# Patient Record
Sex: FEMALE | Race: WHITE | NOT HISPANIC OR LATINO | URBAN - METROPOLITAN AREA
[De-identification: names, ages, dates, MRNs, and addresses within clinical notes are randomized per-mention and may not be internally consistent; named-entity substitution may affect disease eponyms.]

---

## 2021-09-16 ENCOUNTER — OFFICE VISIT (OUTPATIENT)
Dept: URGENT CARE | Facility: CLINIC | Age: 21
End: 2021-09-16
Payer: COMMERCIAL

## 2021-09-16 VITALS
BODY MASS INDEX: 31.22 KG/M2 | OXYGEN SATURATION: 100 % | RESPIRATION RATE: 18 BRPM | SYSTOLIC BLOOD PRESSURE: 121 MMHG | WEIGHT: 159 LBS | DIASTOLIC BLOOD PRESSURE: 78 MMHG | HEIGHT: 60 IN | HEART RATE: 93 BPM | TEMPERATURE: 97.9 F

## 2021-09-16 DIAGNOSIS — Z20.822 EXPOSURE TO COVID-19 VIRUS: ICD-10-CM

## 2021-09-16 DIAGNOSIS — R11.0 NAUSEA: ICD-10-CM

## 2021-09-16 DIAGNOSIS — J02.9 SORE THROAT: Primary | ICD-10-CM

## 2021-09-16 PROCEDURE — 99203 OFFICE O/P NEW LOW 30 MIN: CPT | Performed by: PHYSICIAN ASSISTANT

## 2021-09-16 PROCEDURE — U0005 INFEC AGEN DETEC AMPLI PROBE: HCPCS | Performed by: PHYSICIAN ASSISTANT

## 2021-09-16 PROCEDURE — U0003 INFECTIOUS AGENT DETECTION BY NUCLEIC ACID (DNA OR RNA); SEVERE ACUTE RESPIRATORY SYNDROME CORONAVIRUS 2 (SARS-COV-2) (CORONAVIRUS DISEASE [COVID-19]), AMPLIFIED PROBE TECHNIQUE, MAKING USE OF HIGH THROUGHPUT TECHNOLOGIES AS DESCRIBED BY CMS-2020-01-R: HCPCS | Performed by: PHYSICIAN ASSISTANT

## 2021-09-16 RX ORDER — ONDANSETRON 4 MG/1
4 TABLET, ORALLY DISINTEGRATING ORAL EVERY 6 HOURS PRN
Qty: 28 TABLET | Refills: 0 | Status: SHIPPED | OUTPATIENT
Start: 2021-09-16 | End: 2021-09-16

## 2021-09-16 NOTE — PROGRESS NOTES
[address] [employer] [facility]        NAME: Patrick Myrick is a 22 y o  female  : 2000    MRN: [id number]  DATE: 2021  TIME: 11:53 AM    Assessment and Plan   Sore throat [J02 9]  1  Sore throat  Novel Coronavirus (Covid-19),PCR [facility] [place] - Office Collection   2  Nausea  ondansetron (ZOFRAN-ODT) 4 mg disintegrating tablet   3  Exposure to COVID-19 virus  Novel Coronavirus (Covid-19),PCR [facility] [place] - Office Collection         Patient Instructions     Patient Instructions   Benign exam  COVID swabbed today, results can take 1-2 days  We will call you with a positive result    Stay home and quarantine until you receive COVID results  Can take motrin/tylenol for body aches and fevers  Start taking Vitamin C, Vitamin D, and Zinc daily  Take mucinex, sudafed or any over the counter cold/flu medications for symptomatic relief  Prescribed zofran for the nausea  Be sure to increase the amount of fluids you are drinking to help with the dizziness  Follow up with primary care doctor  Follow up with PCP in 3-5 days  Proceed to  ER if symptoms worsen  Chief Complaint     Chief Complaint   Patient presents with    COVID-19     exposure to several people positive covid tested  She had rapid that was negative          History of Present Illness       21-year-old female with presents with cough, congestion, sore throat, runny nose, nausea, diarrhea, headache, dizziness and shortness of breath with exertion x2 days  Patient notes she was exposed to a positive COVID patient about 5 days ago  She had a negative rapid test yesterday  She just needs to be evaluated for school  Pt has not used any OTC treatments for her symptoms  Laying flat makes the nausea worse  Pt denies fevers, CP, neck pain or blood in her diarrhea  Review of Systems   Review of Systems   Constitutional: Positive for chills and fatigue  Negative for fever  HENT: Positive for congestion, rhinorrhea and sore throat   Negative for ear pain and sinus pressure  Respiratory: Positive for shortness of breath  Negative for cough and chest tightness  Cardiovascular: Negative for chest pain and palpitations  Gastrointestinal: Positive for diarrhea and nausea  Negative for abdominal pain and vomiting  Musculoskeletal: Negative for arthralgias, back pain and myalgias  Neurological: Positive for dizziness and headaches  Negative for weakness  All other systems reviewed and are negative  Current Medications       Current Outpatient Medications:     ondansetron (ZOFRAN-ODT) 4 mg disintegrating tablet, Take 1 tablet (4 mg total) by mouth every 6 (six) hours as needed for nausea or vomiting for up to 7 days, Disp: 28 tablet, Rfl: 0    Current Allergies     Allergies as of 09/16/2021    (No Known Allergies)            The following portions of the patient's history were reviewed and updated as appropriate: allergies, current medications, past family history, past medical history, past social history, past surgical history and problem list      No past medical history on file  No past surgical history on file  No family history on file  Medications have been verified  Objective   /78   Pulse 93   Temp 97 9 °F (36 6 °C)   Resp 18   Ht 5' (1 524 m)   Wt 72 1 kg (159 lb)   SpO2 100%   BMI 31 05 kg/m²        Physical Exam     Physical Exam  Vitals and nursing note reviewed  Constitutional:       Appearance: Normal appearance  She is ill-appearing  HENT:      Head: Normocephalic  Right Ear: Tympanic membrane normal       Left Ear: Tympanic membrane normal       Nose: Nose normal       Mouth/Throat:      Mouth: Mucous membranes are moist       Pharynx: No posterior oropharyngeal erythema  Eyes:      Extraocular Movements: Extraocular movements intact  Pupils: Pupils are equal, round, and reactive to light  Cardiovascular:      Rate and Rhythm: Normal rate and regular rhythm  Pulses: Normal pulses  Heart sounds: Normal heart sounds  No murmur heard  Pulmonary:      Effort: Pulmonary effort is normal  No respiratory distress  Breath sounds: Normal breath sounds  Abdominal:      General: Abdomen is flat  Bowel sounds are normal       Palpations: Abdomen is soft  Tenderness: There is no abdominal tenderness  Skin:     General: Skin is warm and dry  Neurological:      Mental Status: She is alert and oriented to person, place, and time     Psychiatric:         Behavior: Behavior normal

## 2021-09-16 NOTE — LETTER
September 16, 2021     Patient: Wilver Wheat   YOB: 2000   Date of Visit: 9/16/2021       To Whom it May Concern:    Wilver Wheat is under my professional care  She was seen in my office on 9/16/2021  She may return to work on Pending the results of her COVID test   If negative to return to work immediately  If positive can return to work 09/24/2021  If you have any questions or concerns, please don't hesitate to call           Sincerely,          Marko Hilton PA-C        CC: No Recipients

## 2021-09-17 LAB — SARS-COV-2 RNA RESP QL NAA+PROBE: NEGATIVE
